# Patient Record
Sex: MALE | Race: WHITE | Employment: FULL TIME | URBAN - METROPOLITAN AREA
[De-identification: names, ages, dates, MRNs, and addresses within clinical notes are randomized per-mention and may not be internally consistent; named-entity substitution may affect disease eponyms.]

---

## 2022-11-15 ENCOUNTER — OFFICE VISIT (OUTPATIENT)
Dept: FAMILY MEDICINE CLINIC | Age: 47
End: 2022-11-15
Payer: COMMERCIAL

## 2022-11-15 VITALS
RESPIRATION RATE: 18 BRPM | DIASTOLIC BLOOD PRESSURE: 100 MMHG | SYSTOLIC BLOOD PRESSURE: 166 MMHG | OXYGEN SATURATION: 98 % | HEART RATE: 96 BPM | WEIGHT: 315 LBS | TEMPERATURE: 97.7 F | HEIGHT: 70 IN | BODY MASS INDEX: 45.1 KG/M2

## 2022-11-15 DIAGNOSIS — E66.01 MORBID OBESITY WITH BMI OF 50.0-59.9, ADULT (HCC): ICD-10-CM

## 2022-11-15 DIAGNOSIS — Z00.00 ANNUAL PHYSICAL EXAM: ICD-10-CM

## 2022-11-15 DIAGNOSIS — Z23 NEED FOR VACCINATION: ICD-10-CM

## 2022-11-15 DIAGNOSIS — Z00.00 ANNUAL PHYSICAL EXAM: Primary | ICD-10-CM

## 2022-11-15 DIAGNOSIS — I10 PRIMARY HYPERTENSION: ICD-10-CM

## 2022-11-15 LAB
ALBUMIN SERPL-MCNC: 4.5 G/DL (ref 3.5–5.2)
ALP BLD-CCNC: 129 U/L (ref 40–129)
ALT SERPL-CCNC: 64 U/L (ref 0–40)
ANION GAP SERPL CALCULATED.3IONS-SCNC: 12 MMOL/L (ref 7–16)
AST SERPL-CCNC: 48 U/L (ref 0–39)
BILIRUB SERPL-MCNC: 0.4 MG/DL (ref 0–1.2)
BUN BLDV-MCNC: 11 MG/DL (ref 6–20)
CALCIUM SERPL-MCNC: 9.9 MG/DL (ref 8.6–10.2)
CHLORIDE BLD-SCNC: 99 MMOL/L (ref 98–107)
CHOLESTEROL, TOTAL: 218 MG/DL (ref 0–199)
CO2: 29 MMOL/L (ref 22–29)
CREAT SERPL-MCNC: 0.9 MG/DL (ref 0.7–1.2)
GFR SERPL CREATININE-BSD FRML MDRD: >60 ML/MIN/1.73
GLUCOSE BLD-MCNC: 157 MG/DL (ref 74–99)
HBA1C MFR BLD: 7.4 % (ref 4–5.6)
HCT VFR BLD CALC: 49.1 % (ref 37–54)
HDLC SERPL-MCNC: 48 MG/DL
HEMOGLOBIN: 16.1 G/DL (ref 12.5–16.5)
LDL CHOLESTEROL CALCULATED: 136 MG/DL (ref 0–99)
MCH RBC QN AUTO: 31 PG (ref 26–35)
MCHC RBC AUTO-ENTMCNC: 32.8 % (ref 32–34.5)
MCV RBC AUTO: 94.4 FL (ref 80–99.9)
PDW BLD-RTO: 13.1 FL (ref 11.5–15)
PLATELET # BLD: 239 E9/L (ref 130–450)
PMV BLD AUTO: 10.2 FL (ref 7–12)
POTASSIUM SERPL-SCNC: 4.4 MMOL/L (ref 3.5–5)
RBC # BLD: 5.2 E12/L (ref 3.8–5.8)
SODIUM BLD-SCNC: 140 MMOL/L (ref 132–146)
TOTAL PROTEIN: 7.4 G/DL (ref 6.4–8.3)
TRIGL SERPL-MCNC: 170 MG/DL (ref 0–149)
TSH SERPL DL<=0.05 MIU/L-ACNC: 1.31 UIU/ML (ref 0.27–4.2)
VITAMIN D 25-HYDROXY: 14 NG/ML (ref 30–100)
VLDLC SERPL CALC-MCNC: 34 MG/DL
WBC # BLD: 10.2 E9/L (ref 4.5–11.5)

## 2022-11-15 PROCEDURE — 90674 CCIIV4 VAC NO PRSV 0.5 ML IM: CPT | Performed by: FAMILY MEDICINE

## 2022-11-15 PROCEDURE — 3077F SYST BP >= 140 MM HG: CPT | Performed by: FAMILY MEDICINE

## 2022-11-15 PROCEDURE — 99386 PREV VISIT NEW AGE 40-64: CPT | Performed by: FAMILY MEDICINE

## 2022-11-15 PROCEDURE — 3080F DIAST BP >= 90 MM HG: CPT | Performed by: FAMILY MEDICINE

## 2022-11-15 PROCEDURE — 90471 IMMUNIZATION ADMIN: CPT | Performed by: FAMILY MEDICINE

## 2022-11-15 PROCEDURE — G8482 FLU IMMUNIZE ORDER/ADMIN: HCPCS | Performed by: FAMILY MEDICINE

## 2022-11-15 RX ORDER — CLOTRIMAZOLE 1 %
CREAM (GRAM) TOPICAL
COMMUNITY
Start: 2022-08-16

## 2022-11-15 RX ORDER — NAPROXEN 500 MG/1
TABLET ORAL
COMMUNITY
Start: 2022-08-16

## 2022-11-15 RX ORDER — LISINOPRIL 20 MG/1
20 TABLET ORAL DAILY
Qty: 30 TABLET | Refills: 0 | Status: SHIPPED
Start: 2022-11-15 | End: 2022-11-23 | Stop reason: DRUGHIGH

## 2022-11-15 SDOH — ECONOMIC STABILITY: FOOD INSECURITY: WITHIN THE PAST 12 MONTHS, THE FOOD YOU BOUGHT JUST DIDN'T LAST AND YOU DIDN'T HAVE MONEY TO GET MORE.: NEVER TRUE

## 2022-11-15 SDOH — ECONOMIC STABILITY: FOOD INSECURITY: WITHIN THE PAST 12 MONTHS, YOU WORRIED THAT YOUR FOOD WOULD RUN OUT BEFORE YOU GOT MONEY TO BUY MORE.: NEVER TRUE

## 2022-11-15 ASSESSMENT — LIFESTYLE VARIABLES
HOW OFTEN DO YOU HAVE A DRINK CONTAINING ALCOHOL: NEVER
HOW MANY STANDARD DRINKS CONTAINING ALCOHOL DO YOU HAVE ON A TYPICAL DAY: PATIENT DOES NOT DRINK

## 2022-11-15 ASSESSMENT — PATIENT HEALTH QUESTIONNAIRE - PHQ9
SUM OF ALL RESPONSES TO PHQ QUESTIONS 1-9: 0
1. LITTLE INTEREST OR PLEASURE IN DOING THINGS: 0
2. FEELING DOWN, DEPRESSED OR HOPELESS: 0
SUM OF ALL RESPONSES TO PHQ QUESTIONS 1-9: 0
SUM OF ALL RESPONSES TO PHQ9 QUESTIONS 1 & 2: 0

## 2022-11-15 ASSESSMENT — SOCIAL DETERMINANTS OF HEALTH (SDOH): HOW HARD IS IT FOR YOU TO PAY FOR THE VERY BASICS LIKE FOOD, HOUSING, MEDICAL CARE, AND HEATING?: NOT HARD AT ALL

## 2022-11-15 NOTE — PATIENT INSTRUCTIONS
Consider the following medications:    Contrave  Qsymia  Phentermine  GLP-1 agonists- Mounjaro, Victoza, Trulicity, Ozempic  Metformin  Wellbutrin  Topamax    ** weight loss medications may or may not be covered by your insurance. Please check your formulary to see what might be offered. Also, here are some diet tips from obesity medicine specialists:    Rules:  Limit sweets to one day per month  Limit chips to one day month  Limit restaurants (including fast food) to one time every two weeks  Eliminate all sugar additives to coffee and use only low calorie creamers (<25 calories)     Requirements:  Make sure protein intake is at least 70 grams per day (begin using a protein shake - Nectar, Premier, BeneProtein and [employer]NC lean (which is lactose-free) are milk-based options; Nectar, IsoPure Protein Drink, and Protein 2 O are water-based options; Quest (or [employer]) and the Futon 1 protein bars can also be used, but have less protein in them )  Begin eating 3/4 cup of the original, plain Fiber One cereal daily (start with 1/4 cup daily and every week add 1/4 cup daily until reaching the goal of 3/4 cup daily)  Take one multivitamin every day     Goals:  Eat on a 6\" plate and do not eat seconds  Limit snacks to 2 per day  Divide the food on the plate to 1/4 entree, 1/4 starchy vegetables, 1/4 whole grains and 1/4 non-starch vegetables  Avoid eating 2 hours within bedtime       Count every calorie every day  Limit restaurants (including fast food) to one time every two weeks     Requirements:  Make sure protein intake is at least 65 grams per day (begin using a protein shake - Nectar, Premier, BeneProtein, Ensure Max, Boost Max and [employer] (which is lactose-free) are milk-based options;  Nectar, IsoPure Protein Drink, and Protein 2 O are water-based options; Quest (or [employer]) and the Futon 1 protein bars can also be used, but have less protein in them )  Begin eating 3/4 cup of the original, plain Fiber One cereal daily (start with 1/4 cup daily and every 2 weeks add 1/4 cup daily until reaching the goal of 3/4 cup daily)  Take one multivitamin every day     Targets:  Limit calorie intake to 1400 calories/day  Walk 30 minutes daily  Establish 16 hours of food-free periods each day - no period can be less than 1 hours, the periods need to be the same every day, sleep time should be included  Avoid eating 2 hours within bedtime     Tips:  Do not eat outside of the dining room or the kitchen  Do not eat while watching TV, videos, working on the computer or using a smart phone  Never eat food out of the bag.

## 2022-11-15 NOTE — PROGRESS NOTES
11/17/2022    Chief Complaint   Patient presents with    New Patient     Patient voiced having right shoulder pain onset 3 months     Flu Vaccine     Patient requesting       Screening Questions:   Exercise No.  Body mass index is 52.37 kg/m². Counseled on weight loss Yes. PSA/Urology No.  Colon Cancer screening:  No.  Eye exam every 2-4 years, yearly if diabetic  Yes. Dental Exam Yes. Tobacco use. Yes.  Cessation discussed        Shoulder Pain: Patient complaints of right shoulder pain. The pain is described as aching. The onset of the pain was  gradual . Symptoms are aggravated by all activities. Symptoms are diminished by rest.      Labs:  No results found for: EAG  Lab Results   Component Value Date    LDLCALC 136 (H) 11/15/2022        Patient's past medical, surgical, social and/or family history reviewed, updated in chart, and are non-contributory (unless otherwise stated). Medications and allergies also reviewed and updated in chart.     ROS  Review of Systems - General ROS: negative for - chills, fatigue, fever, night sweats, sleep disturbance, weight gain or weight loss  Psychological ROS: negative for - anxiety, behavioral disorder, depression, hallucinations, irritability, memory difficulties, mood swings, sleep disturbances or suicidal ideation  ENT ROS: negative for - epistaxis, headaches, hearing change, nasal congestion, nasal discharge, nasal polyps, sinus pain, tinnitus, vertigo or visual changes  Hematological and Lymphatic ROS: negative for - bleeding problems, blood clots, fatigue or swollen lymph nodes  Respiratory ROS: negative for - cough, orthopnea, shortness of breath, sputum changes, tachypnea or wheezing  Cardiovascular ROS: negative for - chest pain, dyspnea on exertion, irregular heartbeat, loss of consciousness, palpitations, paroxysmal nocturnal dyspnea or rapid heart rate  Gastrointestinal ROS: negative for - abdominal pain, blood in stools, change in bowel habits, constipation, diarrhea, gas/bloating, heartburn or nausea/vomiting  Musculoskeletal ROS: negative for - joint pain, joint stiffness, joint swelling or muscle, back pain, bowel or bladder incontinence  Neurological ROS: negative for - behavioral changes, confusion, dizziness, headaches, memory loss, numbness/tingling, seizures or speech problems, weakness  Dermatological ROS: negative for - dry skin, mole changes, nail changes, pruritus, rash or skin lesion changes    Physical Exam  BP (!) 166/100 (Site: Right Upper Arm, Position: Sitting, Cuff Size: Large Adult)   Pulse 96   Temp 97.7 °F (36.5 °C)   Resp 18   Ht 5' 10\" (1.778 m)   Wt (!) 365 lb (165.6 kg)   SpO2 98%   BMI 52.37 kg/m²   Wt Readings from Last 3 Encounters:   11/15/22 (!) 365 lb (165.6 kg)     Temp Readings from Last 3 Encounters:   11/15/22 97.7 °F (36.5 °C)     BP Readings from Last 3 Encounters:   11/15/22 (!) 166/100     Pulse Readings from Last 3 Encounters:   11/15/22 96       General appearance: alert, well appearing, and in no distress, oriented to person, place, and time and normal appearing weight. CVS exam: normal rate, regular rhythm, normal S1, S2, no murmurs, rubs, clicks or gallops. Radial pulses 2+ bilateral.  PT/DP pulse 2+ bilat. No C/C/E    Chest: clear to auscultation, no wheezes, rales or rhonchi, symmetric air entry. Abdomen: Soft, non-tender, non-distended, positive BS in all 4 quadrants    Extremities:Dorsalis pedis pulses palpated bilaterally, no clubbing, cyanosis, edema or erythema, Sensory exam of the foot is normal, tested with the monofilament. Good pulses, no lesions or ulcers, good peripheral pulses.     SKIN: no lesions, jaundice, petechiae, pallor, cyanosis, ecchymosis    NEURO: gross motor exam normal by observation, gait normal    Mental status - alert, oriented to person, place, and time, normal mood, behavior, speech, dress, motor activity, and thought processes      Assessment/Plan  Caprice Escalante was seen today for new patient and flu vaccine. Diagnoses and all orders for this visit:    Annual physical exam  -     CBC; Future  -     Comprehensive Metabolic Panel; Future  -     Hemoglobin A1C; Future  -     Lipid Panel; Future  -     TSH; Future  -     Vitamin D 25 Hydroxy; Future    Primary hypertension  -     lisinopril (PRINIVIL;ZESTRIL) 20 MG tablet; Take 1 tablet by mouth daily    Morbid obesity with BMI of 50.0-59.9, adult ([facility] [address]    Need for vaccination  -     Influenza, FLUCELVAX, (age 10 mo+), IM, Preservative Free, 0.5 mL      Discussed preventive care and screenings, lab results and the importance of diet and exercise. Advised to return to the office for annual exam or sooner if needed. No follow-ups on file. Call or go to ED immediately if symptoms worsen or persist.    Educational materials and/or home exercises printed for patient's review and were included in patient instructions on his/her After Visit Summary and given to patient at the end of visit. Counseled regarding above diagnosis, including possible risks and complications,  especially if left uncontrolled. Counseled regarding the possible side effects, risks, benefits and alternatives to treatment; patient and/or guardian verbalizes understanding, agrees, feels comfortable with and wishes to proceed with above treatment plan. Advised patient to call with any new medication issues, and read all Rx info from pharmacy to assure aware of all possible risks and side effects of medication before taking. Reviewed age and gender appropriate health screening exams and vaccinations. Advised patient regarding importance of keeping up with recommended health maintenance and to schedule as soon as possible if overdue, as this is important in assessing for undiagnosed pathology, especially cancer, as well as protecting against potentially harmful/life threatening disease.       Patient and/or guardian verbalizes understanding and agrees with above counseling, assessment and plan. All questions answered.

## 2022-11-23 ENCOUNTER — OFFICE VISIT (OUTPATIENT)
Dept: FAMILY MEDICINE CLINIC | Age: 47
End: 2022-11-23
Payer: COMMERCIAL

## 2022-11-23 VITALS
TEMPERATURE: 97.1 F | BODY MASS INDEX: 45.1 KG/M2 | SYSTOLIC BLOOD PRESSURE: 138 MMHG | OXYGEN SATURATION: 95 % | HEIGHT: 70 IN | RESPIRATION RATE: 18 BRPM | DIASTOLIC BLOOD PRESSURE: 84 MMHG | WEIGHT: 315 LBS | HEART RATE: 88 BPM

## 2022-11-23 DIAGNOSIS — I10 PRIMARY HYPERTENSION: Primary | ICD-10-CM

## 2022-11-23 DIAGNOSIS — E11.9 TYPE 2 DIABETES MELLITUS WITHOUT COMPLICATION, WITHOUT LONG-TERM CURRENT USE OF INSULIN (HCC): ICD-10-CM

## 2022-11-23 PROCEDURE — 3075F SYST BP GE 130 - 139MM HG: CPT | Performed by: FAMILY MEDICINE

## 2022-11-23 PROCEDURE — 4004F PT TOBACCO SCREEN RCVD TLK: CPT | Performed by: FAMILY MEDICINE

## 2022-11-23 PROCEDURE — 3051F HG A1C>EQUAL 7.0%<8.0%: CPT | Performed by: FAMILY MEDICINE

## 2022-11-23 PROCEDURE — G8482 FLU IMMUNIZE ORDER/ADMIN: HCPCS | Performed by: FAMILY MEDICINE

## 2022-11-23 PROCEDURE — G8427 DOCREV CUR MEDS BY ELIG CLIN: HCPCS | Performed by: FAMILY MEDICINE

## 2022-11-23 PROCEDURE — 2022F DILAT RTA XM EVC RTNOPTHY: CPT | Performed by: FAMILY MEDICINE

## 2022-11-23 PROCEDURE — 99214 OFFICE O/P EST MOD 30 MIN: CPT | Performed by: FAMILY MEDICINE

## 2022-11-23 PROCEDURE — 3079F DIAST BP 80-89 MM HG: CPT | Performed by: FAMILY MEDICINE

## 2022-11-23 PROCEDURE — G8417 CALC BMI ABV UP PARAM F/U: HCPCS | Performed by: FAMILY MEDICINE

## 2022-11-23 RX ORDER — LISINOPRIL 40 MG/1
40 TABLET ORAL DAILY
Qty: 90 TABLET | Refills: 1 | Status: SHIPPED | OUTPATIENT
Start: 2022-11-23

## 2022-11-23 RX ORDER — METFORMIN HYDROCHLORIDE 500 MG/1
1000 TABLET, EXTENDED RELEASE ORAL
Qty: 180 TABLET | Refills: 1 | Status: SHIPPED | OUTPATIENT
Start: 2022-11-23

## 2022-11-23 NOTE — PROGRESS NOTES
11/23/2022    Chief Complaint   Patient presents with    Follow-up     Pt is a 42 yo male that presents today for a 1 week follow up appt. Pt states he had blood work done that needs to be discussed. Pt is not fasting. HM reviewed. Pt is due for a colonoscopy and is agreeable to one. Diabetes Mellitus Type II, Follow-up: Patient here for follow-up of Type 2 diabetes mellitus. This is a longstanding problem. Is taking all medications as prescribed and is tolerating well. Has been monitoring blood glucose at home. Lab Results   Component Value Date    LABA1C 7.4 (H) 11/15/2022     Lab Results   Component Value Date    LDLCALC 136 (H) 11/15/2022    CREATININE 0.9 11/15/2022        Microalbumin: Microalbumen/Creatinine ratio:  No components found for: RUCREAT     Hypertension: Patient is here for follow up chronic hypertension. Patient is  compliant with lifestyle modifications like exercising and adherence to a low salt diet. Patient denies chest pain, diaphoresis, dyspnea, dyspnea on exertion, peripheral edema, palpitations, HA, visual issues. See List for current meds. Taking as prescribed. No adverse effects. Hyperlipidemia:  Patient presents with hyperlipidemia. Is asymptomatic. This is a chronic problem. Patient is not well controlled, as reviewed and seen on most recent labs. Compliance with treatment thus far has been adequate. No adverse effects. Patient's past medical, surgical, social and/or family history reviewed, updated in chart, and are non-contributory (unless otherwise stated). Medications and allergies also reviewed and updated in chart.     ROS negative unless otherwise specified      Physical Exam  Wt Readings from Last 3 Encounters:   11/23/22 (!) 363 lb 12.8 oz (165 kg)   11/15/22 (!) 365 lb (165.6 kg)     Temp Readings from Last 3 Encounters:   11/23/22 97.1 °F (36.2 °C) (Temporal)   11/15/22 97.7 °F (36.5 °C)     BP Readings from Last 3 Encounters:   11/23/22 138/84 11/15/22 (!) 166/100     Pulse Readings from Last 3 Encounters:   11/23/22 88   11/15/22 96       General appearance: alert, well appearing, and in no distress, oriented to person, place, and time and normal appearing weight. CVS exam: normal rate, regular rhythm, normal S1, S2, no murmurs, rubs, clicks or gallops. Radial pulses 2+ bilateral.  PT/DP pulse 2+ bilat. No C/C/E    Chest: clear to auscultation, no wheezes, rales or rhonchi, symmetric air entry. Abdomen: Soft, non-tender, non-distended, positive BS in all 4 quadrants    Extremities:Dorsalis pedis pulses palpated bilaterally, no clubbing, cyanosis, edema or erythema, Sensory exam of the foot is normal, tested with the monofilament. Good pulses, no lesions or ulcers, good peripheral pulses. SKIN: warm, dry, no lesions, jaundice, petechiae, pallor, cyanosis, ecchymosis    NEURO: gross motor exam normal by observation, gait normal    Mental status - alert, oriented to person, place, and time, normal mood, behavior, speech, dress, motor activity, and thought processes      Assessment/Plan  [facility] was seen today for follow-up. Diagnoses and all orders for this visit:    Primary hypertension  -     lisinopril (PRINIVIL;ZESTRIL) 40 MG tablet; Take 1 tablet by mouth daily    Type 2 diabetes mellitus without complication, without long-term current use of insulin (HCC)  -     Comprehensive Metabolic Panel; Future  -     Lipid Panel; Future  -     Hemoglobin A1C; Future    Other orders  -     metFORMIN (GLUCOPHAGE-XR) 500 MG extended release tablet; Take 2 tablets by mouth daily (with breakfast)      Return in about 3 months (around 2/23/2023), or if symptoms worsen or fail to improve. Call or go to ED immediately if symptoms worsen or persist.    Educational materials and/or home exercises printed for patient's review and were included in patient instructions on his/her After Visit Summary and given to patient at the end of visit. Counseled regarding above diagnosis, including possible risks and complications,  especially if left uncontrolled. Counseled regarding the possible side effects, risks, benefits and alternatives to treatment; patient and/or guardian verbalizes understanding, agrees, feels comfortable with and wishes to proceed with above treatment plan. Advised patient to call with any new medication issues, and read all Rx info from pharmacy to assure aware of all possible risks and side effects of medication before taking. Reviewed age and gender appropriate health screening exams and vaccinations. Advised patient regarding importance of keeping up with recommended health maintenance and to schedule as soon as possible if overdue, as this is important in assessing for undiagnosed pathology, especially cancer, as well as protecting against potentially harmful/life threatening disease. Patient and/or guardian verbalizes understanding and agrees with above counseling, assessment and plan. All questions answered.     Sangeeta Willams, DO

## 2022-11-23 NOTE — PATIENT INSTRUCTIONS
1) Your liver function tests, ALT, AST and/or alkaline phosphatase are elevated. This can indicate damage to your liver. Watch ibuprofen, naproxen, alcohol intake. We will repeat these in 3 months. 2) Diabetes and Hemoglobin A1C:    Your A1C Result: 7.4    Diagnosing Prediabetes or Diabetes    Normal Below 5.7%   Prediabetes 5.7% to 6.4%   Diabetes 6.5% or above   A normal A1C level is below 5.7%, a level of 5.7% to 6.4% indicates prediabetes, and a level of 6.5% or more indicates diabetes. Within the 5.7% to 6.4% prediabetes range, the higher your A1C, the greater your risk is for developing type 2 diabetes. Managing Diabetes  Your A1C result can also be reported as estimated average glucose (eAG), the same numbers (mg/dL) youre used to seeing on your blood sugar meter: Your BLOOD GLUCOSE is elevated. I want you to decrease your carbohydrate intake, especially foods with a high glycemic index. These foods include potatoes, breads, muffins, bagels, pancakes, cereals, rice, pasta, fruits and fruit juices, milk, soft drinks, sweet tea and coffee and table sugar. Consider the following medications:    Contrave  Qsymia  Phentermine  GLP-1 agonists- Mounjaro, Victoza, Trulicity, Ozempic  Metformin  Wellbutrin  Topamax    ** weight loss medications may or may not be covered by your insurance. Please check your formulary to see what might be offered. Also, here are some diet tips from obesity medicine specialists:    Rules:  Limit sweets to one day per month  Limit chips to one day month  Limit restaurants (including fast food) to one time every two weeks  Eliminate all sugar additives to coffee and use only low calorie creamers (<25 calories)     Requirements:  Make sure protein intake is at least 70 grams per day (begin using a protein shake - Nectar, Premier, BeneProtein and [employer] lean (which is lactose-free) are milk-based options;  Nectar, IsoPure Protein Drink, and Protein 2 O are water-based options; Quest (or [employer]) and the [employer] 1 protein bars can also be used, but have less protein in them )  Begin eating 3/4 cup of the original, plain Fiber One cereal daily (start with 1/4 cup daily and every week add 1/4 cup daily until reaching the goal of 3/4 cup daily)  Take one multivitamin every day     Goals:  Eat on a 6\" plate and do not eat seconds  Limit snacks to 2 per day  Divide the food on the plate to 1/4 entree, 1/4 starchy vegetables, 1/4 whole grains and 1/4 non-starch vegetables  Avoid eating 2 hours within bedtime       Count every calorie every day  Limit restaurants (including fast food) to one time every two weeks     Requirements:  Make sure protein intake is at least 65 grams per day (begin using a protein shake - Nectar, Premier, BeneProtein, Ensure Max, Boost Max and [employer] (which is lactose-free) are milk-based options; Nectar, IsoPure Protein Drink, and Protein 2 O are water-based options; [employer] (or [employer]) and the [employer] 1 protein bars can also be used, but have less protein in them )  Begin eating 3/4 cup of the original, plain Fiber One cereal daily (start with 1/4 cup daily and every 2 weeks add 1/4 cup daily until reaching the goal of 3/4 cup daily)  Take one multivitamin every day     Targets:  Limit calorie intake to 1400 calories/day  Walk 30 minutes daily  Establish 16 hours of food-free periods each day - no period can be less than 1 hours, the periods need to be the same every day, sleep time should be included  Avoid eating 2 hours within bedtime     Tips:  Do not eat outside of the dining room or the kitchen  Do not eat while watching TV, videos, working on the computer or using a smart phone  Never eat food out of the bag.

## 2022-11-29 ENCOUNTER — CARE COORDINATION (OUTPATIENT)
Dept: INTERNAL MEDICINE | Age: 47
End: 2022-11-29

## 2022-11-29 NOTE — CARE COORDINATION
Smith Sepulveda who was referred, he is a newly diagnosed diabetic. He did not want to meet in person, states he could not take off any time from work. Offered to do a diabetic education session over the phone. He agreed to me calling him on Thursday Dec 1st at 11:30 am and he should have time to talk it is his lunch time. States that he had family members and wife who is diabetic so he does know a lot. Agreed to call him at agreed upon time.

## 2022-12-01 ENCOUNTER — CARE COORDINATION (OUTPATIENT)
Dept: INTERNAL MEDICINE | Age: 47
End: 2022-12-01

## 2022-12-01 NOTE — CARE COORDINATION
Ema Freed as he had previously asked me to call at this time on his time during his lunch break. Left a message with my contact information [phone] for him to call for diabetic education. . Last time we talked he states he was too busy to meet for diabetic education but would talk over the phone.

## 2022-12-07 ENCOUNTER — CARE COORDINATION (OUTPATIENT)
Dept: FAMILY MEDICINE CLINIC | Age: 47
End: 2022-12-07

## 2022-12-07 NOTE — CARE COORDINATION
Have called 2 times to meet or talk over the phone about his diabetic management. Last we had talked he states he was too busy to meet and had pre arranged to talk at his lunch break. He did not answer or call back He was referred by Dr Keith Carrillo newly diagnosed diabetic. Sent a letter with my contact information for him to call me at his convenience. BMI is 52, he takes metformin. He does have family members with diabetes. Sent letter on how to reach me, and educational material understanding type 2 Diabetes, Factors affecting Blood Sugars, Smart Solutions for People with Diabetes, Planning Healthy Meals.